# Patient Record
Sex: MALE | Race: BLACK OR AFRICAN AMERICAN | NOT HISPANIC OR LATINO | Employment: UNEMPLOYED | URBAN - METROPOLITAN AREA
[De-identification: names, ages, dates, MRNs, and addresses within clinical notes are randomized per-mention and may not be internally consistent; named-entity substitution may affect disease eponyms.]

---

## 2020-01-07 ENCOUNTER — HOSPITAL ENCOUNTER (EMERGENCY)
Facility: HOSPITAL | Age: 25
Discharge: HOME | End: 2020-01-07
Attending: EMERGENCY MEDICINE
Payer: MEDICAID

## 2020-01-07 VITALS
SYSTOLIC BLOOD PRESSURE: 117 MMHG | DIASTOLIC BLOOD PRESSURE: 70 MMHG | RESPIRATION RATE: 20 BRPM | HEART RATE: 69 BPM | OXYGEN SATURATION: 99 % | TEMPERATURE: 98 F
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
## 2020-01-07 DIAGNOSIS — R22.0 FACIAL SWELLING: Primary | ICD-10-CM

## 2020-01-07 PROCEDURE — 99284 EMERGENCY DEPT VISIT MOD MDM: CPT | Mod: 25,ER
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
## 2021-01-13 ENCOUNTER — HOSPITAL ENCOUNTER (EMERGENCY)
Facility: HOSPITAL | Age: 26
Discharge: HOME | End: 2021-01-13
Attending: EMERGENCY MEDICINE
Payer: COMMERCIAL

## 2021-01-13 VITALS
BODY MASS INDEX: 20.73 KG/M2 | WEIGHT: 140 LBS | DIASTOLIC BLOOD PRESSURE: 75 MMHG | HEART RATE: 72 BPM | HEIGHT: 69 IN | RESPIRATION RATE: 20 BRPM | OXYGEN SATURATION: 98 % | TEMPERATURE: 99 F | SYSTOLIC BLOOD PRESSURE: 136 MMHG

## 2021-01-13 DIAGNOSIS — S61.210A LACERATION OF RIGHT INDEX FINGER WITHOUT FOREIGN BODY WITHOUT DAMAGE TO NAIL, INITIAL ENCOUNTER: Primary | ICD-10-CM

## 2021-01-13 PROCEDURE — 90471 IMMUNIZATION ADMIN: CPT | Mod: ER | Performed by: PHYSICIAN ASSISTANT

## 2021-01-13 PROCEDURE — 12001 RPR S/N/AX/GEN/TRNK 2.5CM/<: CPT | Mod: ER

## 2021-01-13 PROCEDURE — 25000003 PHARM REV CODE 250: Mod: ER | Performed by: PHYSICIAN ASSISTANT

## 2021-01-13 PROCEDURE — 63600175 PHARM REV CODE 636 W HCPCS: Mod: ER | Performed by: PHYSICIAN ASSISTANT

## 2021-01-13 PROCEDURE — 90715 TDAP VACCINE 7 YRS/> IM: CPT | Mod: ER | Performed by: PHYSICIAN ASSISTANT

## 2021-01-13 PROCEDURE — 99284 EMERGENCY DEPT VISIT MOD MDM: CPT | Mod: 25,ER

## 2021-01-13 RX ORDER — LIDOCAINE HYDROCHLORIDE 10 MG/ML
10 INJECTION, SOLUTION EPIDURAL; INFILTRATION; INTRACAUDAL; PERINEURAL
Status: COMPLETED | OUTPATIENT
Start: 2021-01-13 | End: 2021-01-13

## 2021-01-13 RX ORDER — CEPHALEXIN 500 MG/1
500 CAPSULE ORAL 2 TIMES DAILY
Qty: 14 CAPSULE | Refills: 0 | Status: SHIPPED | OUTPATIENT
Start: 2021-01-13 | End: 2021-01-20

## 2021-01-13 RX ORDER — IBUPROFEN 600 MG/1
600 TABLET ORAL EVERY 8 HOURS PRN
Qty: 15 TABLET | Refills: 0 | Status: SHIPPED | OUTPATIENT
Start: 2021-01-13 | End: 2021-01-18

## 2021-01-13 RX ADMIN — LIDOCAINE HYDROCHLORIDE 100 MG: 10 INJECTION, SOLUTION EPIDURAL; INFILTRATION; INTRACAUDAL at 12:01

## 2021-01-13 RX ADMIN — CLOSTRIDIUM TETANI TOXOID ANTIGEN (FORMALDEHYDE INACTIVATED), CORYNEBACTERIUM DIPHTHERIAE TOXOID ANTIGEN (FORMALDEHYDE INACTIVATED), BORDETELLA PERTUSSIS TOXOID ANTIGEN (GLUTARALDEHYDE INACTIVATED), BORDETELLA PERTUSSIS FILAMENTOUS HEMAGGLUTININ ANTIGEN (FORMALDEHYDE INACTIVATED), BORDETELLA PERTUSSIS PERTACTIN ANTIGEN, AND BORDETELLA PERTUSSIS FIMBRIAE 2/3 ANTIGEN 0.5 ML: 5; 2; 2.5; 5; 3; 5 INJECTION, SUSPENSION INTRAMUSCULAR at 12:01

## 2021-01-27 ENCOUNTER — HOSPITAL ENCOUNTER (EMERGENCY)
Facility: HOSPITAL | Age: 26
Discharge: HOME | End: 2021-01-27
Attending: EMERGENCY MEDICINE
Payer: COMMERCIAL

## 2021-01-27 VITALS
OXYGEN SATURATION: 99 % | HEIGHT: 68 IN | RESPIRATION RATE: 20 BRPM | BODY MASS INDEX: 19.7 KG/M2 | SYSTOLIC BLOOD PRESSURE: 126 MMHG | WEIGHT: 130 LBS | DIASTOLIC BLOOD PRESSURE: 63 MMHG | HEART RATE: 68 BPM | TEMPERATURE: 98 F

## 2021-01-27 DIAGNOSIS — Z48.02 ENCOUNTER FOR REMOVAL OF SUTURES: Primary | ICD-10-CM

## 2021-01-27 PROCEDURE — 99282 EMERGENCY DEPT VISIT SF MDM: CPT | Mod: ER

## 2024-07-28 ENCOUNTER — HOSPITAL ENCOUNTER (INPATIENT)
Facility: HOSPITAL | Age: 29
LOS: 1 days | Discharge: HOME | DRG: 917 | End: 2024-07-30
Attending: STUDENT IN AN ORGANIZED HEALTH CARE EDUCATION/TRAINING PROGRAM | Admitting: INTERNAL MEDICINE
Payer: MEDICAID

## 2024-07-28 DIAGNOSIS — R07.9 CHEST PAIN: ICD-10-CM

## 2024-07-28 DIAGNOSIS — G93.40 ACUTE ENCEPHALOPATHY: ICD-10-CM

## 2024-07-28 DIAGNOSIS — T50.901A ACCIDENTAL DRUG OVERDOSE, INITIAL ENCOUNTER: ICD-10-CM

## 2024-07-28 DIAGNOSIS — F12.20 TETRAHYDROCANNABINOL (THC) USE DISORDER, MODERATE, DEPENDENCE: ICD-10-CM

## 2024-07-28 DIAGNOSIS — T50.901A ACCIDENTAL OVERDOSE, INITIAL ENCOUNTER: Primary | ICD-10-CM

## 2024-07-28 DIAGNOSIS — M62.82 NON-TRAUMATIC RHABDOMYOLYSIS: ICD-10-CM

## 2024-07-28 DIAGNOSIS — G93.40 ENCEPHALOPATHY: ICD-10-CM

## 2024-07-28 LAB
ALBUMIN SERPL BCP-MCNC: 4.5 G/DL (ref 3.5–5.2)
ALLENS TEST: ABNORMAL
ALP SERPL-CCNC: 55 U/L (ref 38–126)
ALT SERPL W/O P-5'-P-CCNC: 26 U/L (ref 10–44)
ANION GAP SERPL CALC-SCNC: 12 MMOL/L (ref 8–16)
APAP SERPL-MCNC: <10 UG/ML (ref 10–20)
AST SERPL-CCNC: 115 U/L (ref 15–46)
BASOPHILS # BLD AUTO: 0.01 K/UL (ref 0–0.2)
BASOPHILS NFR BLD: 0.1 % (ref 0–1.9)
BILIRUB SERPL-MCNC: 1.4 MG/DL (ref 0.1–1)
CALCIUM SERPL-MCNC: 9.1 MG/DL (ref 8.7–10.5)
CHLORIDE SERPL-SCNC: 100 MMOL/L (ref 95–110)
CK SERPL-CCNC: >3200 U/L (ref 55–170)
CO2 SERPL-SCNC: 27 MMOL/L (ref 23–29)
CREAT SERPL-MCNC: 0.83 MG/DL (ref 0.5–1.4)
DELSYS: ABNORMAL
DIFFERENTIAL METHOD BLD: ABNORMAL
EOSINOPHIL # BLD AUTO: 0 K/UL (ref 0–0.5)
EOSINOPHIL NFR BLD: 0 % (ref 0–8)
ERYTHROCYTE [DISTWIDTH] IN BLOOD BY AUTOMATED COUNT: 13.1 % (ref 11.5–14.5)
EST. GFR  (NO RACE VARIABLE): >60 ML/MIN/1.73 M^2
ETHANOL SERPL-MCNC: <10 MG/DL
GLUCOSE SERPL-MCNC: 130 MG/DL (ref 70–110)
HCO3 UR-SCNC: 27.8 MMOL/L (ref 24–28)
HCT VFR BLD AUTO: 43 % (ref 40–54)
HGB BLD-MCNC: 14.7 G/DL (ref 14–18)
IMM GRANULOCYTES # BLD AUTO: 0.04 K/UL (ref 0–0.04)
IMM GRANULOCYTES NFR BLD AUTO: 0.3 % (ref 0–0.5)
LYMPHOCYTES # BLD AUTO: 1.7 K/UL (ref 1–4.8)
LYMPHOCYTES NFR BLD: 12.6 % (ref 18–48)
MCH RBC QN AUTO: 28.7 PG (ref 27–31)
MCHC RBC AUTO-ENTMCNC: 34.2 G/DL (ref 32–36)
MCV RBC AUTO: 84 FL (ref 82–98)
MONOCYTES # BLD AUTO: 1 K/UL (ref 0.3–1)
MONOCYTES NFR BLD: 7.4 % (ref 4–15)
NEUTROPHILS # BLD AUTO: 10.6 K/UL (ref 1.8–7.7)
NEUTROPHILS NFR BLD: 79.6 % (ref 38–73)
NRBC BLD-RTO: 0 /100 WBC
PCO2 BLDA: 57.1 MMHG (ref 35–45)
PH SMN: 7.3 [PH] (ref 7.35–7.45)
PLATELET # BLD AUTO: 235 K/UL (ref 150–450)
PMV BLD AUTO: 10.9 FL (ref 9.2–12.9)
PO2 BLDA: 215 MMHG (ref 80–100)
POC BE: 1 MMOL/L
POC SATURATED O2: 100 % (ref 95–100)
POC TCO2: 30 MMOL/L (ref 23–27)
POCT GLUCOSE: 118 MG/DL (ref 70–110)
POTASSIUM SERPL-SCNC: 4 MMOL/L (ref 3.5–5.1)
PROT SERPL-MCNC: 8.4 G/DL (ref 6–8.4)
RBC # BLD AUTO: 5.13 M/UL (ref 4.6–6.2)
SALICYLATES SERPL-MCNC: <5 MG/DL (ref 15–30)
SAMPLE: ABNORMAL
SITE: ABNORMAL
SODIUM SERPL-SCNC: 139 MMOL/L (ref 136–145)
UUN UR-MCNC: 13 MG/DL (ref 2–20)
WBC # BLD AUTO: 13.3 K/UL (ref 3.9–12.7)

## 2024-07-28 PROCEDURE — 96361 HYDRATE IV INFUSION ADD-ON: CPT | Mod: ER

## 2024-07-28 PROCEDURE — 36600 WITHDRAWAL OF ARTERIAL BLOOD: CPT | Mod: ER

## 2024-07-28 PROCEDURE — 99900035 HC TECH TIME PER 15 MIN (STAT): Mod: ER

## 2024-07-28 PROCEDURE — G0378 HOSPITAL OBSERVATION PER HR: HCPCS | Mod: ER

## 2024-07-28 PROCEDURE — 80143 DRUG ASSAY ACETAMINOPHEN: CPT | Mod: ER | Performed by: STUDENT IN AN ORGANIZED HEALTH CARE EDUCATION/TRAINING PROGRAM

## 2024-07-28 PROCEDURE — 85025 COMPLETE CBC W/AUTO DIFF WBC: CPT | Mod: ER | Performed by: STUDENT IN AN ORGANIZED HEALTH CARE EDUCATION/TRAINING PROGRAM

## 2024-07-28 PROCEDURE — 80053 COMPREHEN METABOLIC PANEL: CPT | Mod: ER | Performed by: STUDENT IN AN ORGANIZED HEALTH CARE EDUCATION/TRAINING PROGRAM

## 2024-07-28 PROCEDURE — 96374 THER/PROPH/DIAG INJ IV PUSH: CPT | Mod: ER

## 2024-07-28 PROCEDURE — 27000221 HC OXYGEN, UP TO 24 HOURS: Mod: ER

## 2024-07-28 PROCEDURE — 96376 TX/PRO/DX INJ SAME DRUG ADON: CPT | Mod: ER

## 2024-07-28 PROCEDURE — 82550 ASSAY OF CK (CPK): CPT | Mod: ER | Performed by: STUDENT IN AN ORGANIZED HEALTH CARE EDUCATION/TRAINING PROGRAM

## 2024-07-28 PROCEDURE — 63600175 PHARM REV CODE 636 W HCPCS: Mod: ER | Performed by: STUDENT IN AN ORGANIZED HEALTH CARE EDUCATION/TRAINING PROGRAM

## 2024-07-28 PROCEDURE — 93010 ELECTROCARDIOGRAM REPORT: CPT | Mod: ,,, | Performed by: STUDENT IN AN ORGANIZED HEALTH CARE EDUCATION/TRAINING PROGRAM

## 2024-07-28 PROCEDURE — 99291 CRITICAL CARE FIRST HOUR: CPT | Mod: ER

## 2024-07-28 PROCEDURE — 25000003 PHARM REV CODE 250: Mod: ER | Performed by: STUDENT IN AN ORGANIZED HEALTH CARE EDUCATION/TRAINING PROGRAM

## 2024-07-28 PROCEDURE — 80179 DRUG ASSAY SALICYLATE: CPT | Mod: ER | Performed by: STUDENT IN AN ORGANIZED HEALTH CARE EDUCATION/TRAINING PROGRAM

## 2024-07-28 PROCEDURE — 82077 ASSAY SPEC XCP UR&BREATH IA: CPT | Mod: ER | Performed by: STUDENT IN AN ORGANIZED HEALTH CARE EDUCATION/TRAINING PROGRAM

## 2024-07-28 PROCEDURE — 82803 BLOOD GASES ANY COMBINATION: CPT | Mod: ER

## 2024-07-28 PROCEDURE — 93005 ELECTROCARDIOGRAM TRACING: CPT | Mod: ER

## 2024-07-28 PROCEDURE — 94760 N-INVAS EAR/PLS OXIMETRY 1: CPT | Mod: ER,XB

## 2024-07-28 RX ORDER — NALOXONE HCL 0.4 MG/ML
0.4 VIAL (ML) INJECTION
Status: COMPLETED | OUTPATIENT
Start: 2024-07-28 | End: 2024-07-28

## 2024-07-28 RX ORDER — SODIUM CHLORIDE 9 MG/ML
1000 INJECTION, SOLUTION INTRAVENOUS
Status: COMPLETED | OUTPATIENT
Start: 2024-07-28 | End: 2024-07-28

## 2024-07-28 RX ADMIN — SODIUM CHLORIDE 1000 ML: 0.9 INJECTION, SOLUTION INTRAVENOUS at 07:07

## 2024-07-28 RX ADMIN — NALXONE HYDROCHLORIDE 0.4 MG: 0.4 INJECTION INTRAMUSCULAR; INTRAVENOUS; SUBCUTANEOUS at 08:07

## 2024-07-28 RX ADMIN — SODIUM CHLORIDE 1000 ML: 9 INJECTION, SOLUTION INTRAVENOUS at 10:07

## 2024-07-28 RX ADMIN — SODIUM CHLORIDE 1000 ML: 0.9 INJECTION, SOLUTION INTRAVENOUS at 10:07

## 2024-07-29 PROBLEM — T50.901A ACCIDENTAL DRUG OVERDOSE: Status: ACTIVE | Noted: 2024-07-29

## 2024-07-29 PROBLEM — F12.20 TETRAHYDROCANNABINOL (THC) USE DISORDER, MODERATE, DEPENDENCE: Status: ACTIVE | Noted: 2024-07-29

## 2024-07-29 PROBLEM — G93.40 ENCEPHALOPATHY: Status: ACTIVE | Noted: 2024-07-29

## 2024-07-29 PROBLEM — G93.40 ENCEPHALOPATHY: Status: RESOLVED | Noted: 2024-07-29 | Resolved: 2024-07-29

## 2024-07-29 PROBLEM — T50.901A ACCIDENTAL OVERDOSE: Status: ACTIVE | Noted: 2024-07-29

## 2024-07-29 PROBLEM — G93.40 ACUTE ENCEPHALOPATHY: Status: ACTIVE | Noted: 2024-07-29

## 2024-07-29 PROBLEM — M62.82 NON-TRAUMATIC RHABDOMYOLYSIS: Status: ACTIVE | Noted: 2024-07-29

## 2024-07-29 LAB
ALBUMIN SERPL BCP-MCNC: 3.4 G/DL (ref 3.5–5.2)
ALLENS TEST: YES
ALP SERPL-CCNC: 41 U/L (ref 55–135)
ALT SERPL W/O P-5'-P-CCNC: 23 U/L (ref 10–44)
AMPHET+METHAMPHET UR QL: NEGATIVE
ANION GAP SERPL CALC-SCNC: 5 MMOL/L (ref 8–16)
ANION GAP SERPL CALC-SCNC: 7 MMOL/L (ref 8–16)
AST SERPL-CCNC: 85 U/L (ref 10–40)
BARBITURATES UR QL SCN>200 NG/ML: NEGATIVE
BASOPHILS # BLD AUTO: 0.03 K/UL (ref 0–0.2)
BASOPHILS NFR BLD: 0.4 % (ref 0–1.9)
BENZODIAZ UR QL SCN>200 NG/ML: NEGATIVE
BILIRUB SERPL-MCNC: 1.4 MG/DL (ref 0.1–1)
BILIRUB UR QL STRIP: NEGATIVE
BUN SERPL-MCNC: 11 MG/DL (ref 6–20)
BUN SERPL-MCNC: 12 MG/DL (ref 6–20)
BZE UR QL SCN: NEGATIVE
CALCIUM SERPL-MCNC: 8.6 MG/DL (ref 8.7–10.5)
CALCIUM SERPL-MCNC: 8.8 MG/DL (ref 8.7–10.5)
CANNABINOIDS UR QL SCN: ABNORMAL
CHLORIDE SERPL-SCNC: 108 MMOL/L (ref 95–110)
CHLORIDE SERPL-SCNC: 109 MMOL/L (ref 95–110)
CK SERPL-CCNC: 3246 U/L (ref 20–200)
CK SERPL-CCNC: 4107 U/L (ref 20–200)
CLARITY UR: CLEAR
CO2 SERPL-SCNC: 27 MMOL/L (ref 23–29)
CO2 SERPL-SCNC: 28 MMOL/L (ref 23–29)
COLOR UR: COLORLESS
CREAT SERPL-MCNC: 0.9 MG/DL (ref 0.5–1.4)
CREAT SERPL-MCNC: 1.1 MG/DL (ref 0.5–1.4)
CREAT UR-MCNC: 47.2 MG/DL (ref 23–375)
DIFFERENTIAL METHOD BLD: ABNORMAL
EOSINOPHIL # BLD AUTO: 0 K/UL (ref 0–0.5)
EOSINOPHIL NFR BLD: 0.4 % (ref 0–8)
ERYTHROCYTE [DISTWIDTH] IN BLOOD BY AUTOMATED COUNT: 13.5 % (ref 11.5–14.5)
EST. GFR  (NO RACE VARIABLE): >60 ML/MIN/1.73 M^2
EST. GFR  (NO RACE VARIABLE): >60 ML/MIN/1.73 M^2
FIO2: 21 %
GLUCOSE SERPL-MCNC: 100 MG/DL (ref 70–110)
GLUCOSE SERPL-MCNC: 105 MG/DL (ref 70–110)
GLUCOSE UR QL STRIP: NEGATIVE
HCT VFR BLD AUTO: 39.5 % (ref 40–54)
HGB BLD-MCNC: 13.2 G/DL (ref 14–18)
HGB UR QL STRIP: NEGATIVE
IMM GRANULOCYTES # BLD AUTO: 0.02 K/UL (ref 0–0.04)
IMM GRANULOCYTES NFR BLD AUTO: 0.2 % (ref 0–0.5)
INR PPP: 1.2 (ref 0.8–1.2)
KETONES UR QL STRIP: NEGATIVE
LACTATE SERPL-SCNC: 1.1 MMOL/L (ref 0.5–2.2)
LEUKOCYTE ESTERASE UR QL STRIP: NEGATIVE
LYMPHOCYTES # BLD AUTO: 1.7 K/UL (ref 1–4.8)
LYMPHOCYTES NFR BLD: 19.9 % (ref 18–48)
MAGNESIUM SERPL-MCNC: 1.9 MG/DL (ref 1.6–2.6)
MCH RBC QN AUTO: 28.2 PG (ref 27–31)
MCHC RBC AUTO-ENTMCNC: 33.4 G/DL (ref 32–36)
MCV RBC AUTO: 84 FL (ref 82–98)
METHADONE UR QL SCN>300 NG/ML: NEGATIVE
MONOCYTES # BLD AUTO: 0.6 K/UL (ref 0.3–1)
MONOCYTES NFR BLD: 7.6 % (ref 4–15)
NEUTROPHILS # BLD AUTO: 6 K/UL (ref 1.8–7.7)
NEUTROPHILS NFR BLD: 71.5 % (ref 38–73)
NITRITE UR QL STRIP: NEGATIVE
NRBC BLD-RTO: 0 /100 WBC
OHS QRS DURATION: 80 MS
OHS QTC CALCULATION: 406 MS
OPIATES UR QL SCN: NEGATIVE
PCO2 BLDA: 56.3 MMHG (ref 35–45)
PCP UR QL SCN>25 NG/ML: NEGATIVE
PH SMN: 7.32 [PH] (ref 7.35–7.45)
PH UR STRIP: 6 [PH] (ref 5–8)
PHOSPHATE SERPL-MCNC: 2.6 MG/DL (ref 2.7–4.5)
PLATELET # BLD AUTO: 193 K/UL (ref 150–450)
PMV BLD AUTO: 11.2 FL (ref 9.2–12.9)
PO2 BLDA: 24.6 MMHG (ref 40–60)
POC BASE DEFICIT: 1.9 MMOL/L (ref -2–2)
POC HCO3: 29.2 MMOL/L (ref 24–28)
POC PERFORMED BY: ABNORMAL
POC SATURATED O2: 45.5 % (ref 95–100)
POCT GLUCOSE: 98 MG/DL (ref 70–110)
POTASSIUM SERPL-SCNC: 3.9 MMOL/L (ref 3.5–5.1)
POTASSIUM SERPL-SCNC: 4.2 MMOL/L (ref 3.5–5.1)
PROT SERPL-MCNC: 6.5 G/DL (ref 6–8.4)
PROT UR QL STRIP: NEGATIVE
PROTHROMBIN TIME: 12.6 SEC (ref 9–12.5)
RBC # BLD AUTO: 4.68 M/UL (ref 4.6–6.2)
SODIUM SERPL-SCNC: 141 MMOL/L (ref 136–145)
SODIUM SERPL-SCNC: 143 MMOL/L (ref 136–145)
SP GR UR STRIP: 1.01 (ref 1–1.03)
SPECIMEN SOURCE: ABNORMAL
TOXICOLOGY INFORMATION: ABNORMAL
URN SPEC COLLECT METH UR: ABNORMAL
UROBILINOGEN UR STRIP-ACNC: NEGATIVE EU/DL
WBC # BLD AUTO: 8.33 K/UL (ref 3.9–12.7)

## 2024-07-29 PROCEDURE — 36415 COLL VENOUS BLD VENIPUNCTURE: CPT

## 2024-07-29 PROCEDURE — 36415 COLL VENOUS BLD VENIPUNCTURE: CPT | Mod: XB

## 2024-07-29 PROCEDURE — 80320 DRUG SCREEN QUANTALCOHOLS: CPT

## 2024-07-29 PROCEDURE — 80053 COMPREHEN METABOLIC PANEL: CPT

## 2024-07-29 PROCEDURE — 94761 N-INVAS EAR/PLS OXIMETRY MLT: CPT | Mod: XB

## 2024-07-29 PROCEDURE — 80048 BASIC METABOLIC PNL TOTAL CA: CPT | Mod: XB

## 2024-07-29 PROCEDURE — 81003 URINALYSIS AUTO W/O SCOPE: CPT | Mod: 59

## 2024-07-29 PROCEDURE — 21400001 HC TELEMETRY ROOM

## 2024-07-29 PROCEDURE — 99900035 HC TECH TIME PER 15 MIN (STAT)

## 2024-07-29 PROCEDURE — 63600175 PHARM REV CODE 636 W HCPCS

## 2024-07-29 PROCEDURE — 80307 DRUG TEST PRSMV CHEM ANLYZR: CPT

## 2024-07-29 PROCEDURE — 83735 ASSAY OF MAGNESIUM: CPT

## 2024-07-29 PROCEDURE — 82803 BLOOD GASES ANY COMBINATION: CPT

## 2024-07-29 PROCEDURE — 83605 ASSAY OF LACTIC ACID: CPT

## 2024-07-29 PROCEDURE — 85025 COMPLETE CBC W/AUTO DIFF WBC: CPT

## 2024-07-29 PROCEDURE — 82550 ASSAY OF CK (CPK): CPT | Mod: 91

## 2024-07-29 PROCEDURE — 82550 ASSAY OF CK (CPK): CPT

## 2024-07-29 PROCEDURE — 84100 ASSAY OF PHOSPHORUS: CPT

## 2024-07-29 PROCEDURE — 85610 PROTHROMBIN TIME: CPT

## 2024-07-29 RX ORDER — SODIUM CHLORIDE 0.9 % (FLUSH) 0.9 %
10 SYRINGE (ML) INJECTION EVERY 12 HOURS PRN
Status: DISCONTINUED | OUTPATIENT
Start: 2024-07-29 | End: 2024-07-30 | Stop reason: HOSPADM

## 2024-07-29 RX ORDER — ONDANSETRON HYDROCHLORIDE 2 MG/ML
4 INJECTION, SOLUTION INTRAVENOUS EVERY 8 HOURS PRN
Status: DISCONTINUED | OUTPATIENT
Start: 2024-07-29 | End: 2024-07-30 | Stop reason: HOSPADM

## 2024-07-29 RX ORDER — SODIUM CHLORIDE, SODIUM LACTATE, POTASSIUM CHLORIDE, CALCIUM CHLORIDE 600; 310; 30; 20 MG/100ML; MG/100ML; MG/100ML; MG/100ML
INJECTION, SOLUTION INTRAVENOUS CONTINUOUS
Status: ACTIVE | OUTPATIENT
Start: 2024-07-29 | End: 2024-07-29

## 2024-07-29 RX ORDER — IBUPROFEN 200 MG
24 TABLET ORAL
Status: DISCONTINUED | OUTPATIENT
Start: 2024-07-29 | End: 2024-07-30 | Stop reason: HOSPADM

## 2024-07-29 RX ORDER — IBUPROFEN 200 MG
16 TABLET ORAL
Status: DISCONTINUED | OUTPATIENT
Start: 2024-07-29 | End: 2024-07-30 | Stop reason: HOSPADM

## 2024-07-29 RX ORDER — SODIUM CHLORIDE, SODIUM LACTATE, POTASSIUM CHLORIDE, CALCIUM CHLORIDE 600; 310; 30; 20 MG/100ML; MG/100ML; MG/100ML; MG/100ML
INJECTION, SOLUTION INTRAVENOUS CONTINUOUS
Status: ACTIVE | OUTPATIENT
Start: 2024-07-29 | End: 2024-07-30

## 2024-07-29 RX ORDER — GLUCAGON 1 MG
1 KIT INJECTION
Status: DISCONTINUED | OUTPATIENT
Start: 2024-07-29 | End: 2024-07-30 | Stop reason: HOSPADM

## 2024-07-29 RX ORDER — NALOXONE HCL 0.4 MG/ML
0.02 VIAL (ML) INJECTION
Status: DISCONTINUED | OUTPATIENT
Start: 2024-07-29 | End: 2024-07-30 | Stop reason: HOSPADM

## 2024-07-29 RX ADMIN — SODIUM CHLORIDE, POTASSIUM CHLORIDE, SODIUM LACTATE AND CALCIUM CHLORIDE 1000 ML: 600; 310; 30; 20 INJECTION, SOLUTION INTRAVENOUS at 03:07

## 2024-07-29 RX ADMIN — SODIUM CHLORIDE, POTASSIUM CHLORIDE, SODIUM LACTATE AND CALCIUM CHLORIDE: 600; 310; 30; 20 INJECTION, SOLUTION INTRAVENOUS at 07:07

## 2024-07-29 RX ADMIN — SODIUM CHLORIDE, POTASSIUM CHLORIDE, SODIUM LACTATE AND CALCIUM CHLORIDE 1000 ML: 600; 310; 30; 20 INJECTION, SOLUTION INTRAVENOUS at 12:07

## 2024-07-29 RX ADMIN — SODIUM CHLORIDE, POTASSIUM CHLORIDE, SODIUM LACTATE AND CALCIUM CHLORIDE: 600; 310; 30; 20 INJECTION, SOLUTION INTRAVENOUS at 01:07

## 2024-07-29 NOTE — ED NOTES
Voicemail left for pt's mother informing her pt is being transferred at this time. Pt remains alert to verbal stimuli only and only able to answer simple questions/commands. VSS.

## 2024-07-29 NOTE — PLAN OF CARE
Oriented to self only. Pt very lethargic throughout the day. Mother @ bedside. Diet advanced to regular. Tolerated food & liquid well. Was able to use urinal by himself. MD rounded on pt this am & orders modified as needed. Hydrated w/ continuous fluids. Bed in lowest position. Call light within reach.

## 2024-07-29 NOTE — ED NOTES
While attempting to void in urinal pt missed urinal and voided on self. Pt cleaned, linens changed and new gown provided. Pt's clothing placed in belongings bag and labeled with pt info.

## 2024-07-29 NOTE — ED NOTES
Pt presented to ED after mother reported possible overdose stating pt has been minimally responsive all day long. Pt currently responding to verbal stimuli and able to state name but is not able to state any symptoms. Pt denies taking drugs or medication today.

## 2024-07-29 NOTE — PLAN OF CARE
Resting in bed denies pain and discomfort , mother at the bedside. Call light within reach bed in low position bed alarm in place.

## 2024-07-29 NOTE — PLAN OF CARE
Problem: Adult Inpatient Plan of Care  Goal: Plan of Care Review  Outcome: Progressing     VIRTUAL NURSE:  Cued into patient's room.  Patient not responding to introduction and permission inquiry.  Patient resting comfortably in bed with eyes closed; respirations even and unlabored.  No distress noted.    Labs, notes, orders, and careplan reviewed.       07/29/24 0224   Patient Request   Patient Requested ALON Banks woke patient up with tactile and loud verbal stimuli. Lethargic; nonsensical words when asked admission questions.   Nurse Notification   Charge Nurse Notified? Yes   Name of Charge Nurse ALON Banks   Bedside Nurse Notified? Yes   Name of Bedside Nurse ALON Harris   Nurse Notfication Method Secure Chat   Nurse Notified Of Other  (patient arrived lethargic; no change since arrival)   Admission   Initial VN Admission Questions Incomplete   Communication Issues? None   Shift   Virtual Nurse - Rounding Complete   Virtual Nurse - Patient Verbalized Approval Of Other (See Comments)  (RUTH ANN-lethargic)   Type of Frequent Check   Type Patient Rounds   Safety/Activity   Patient Rounds bed in low position;placement of personal items at bedside;bed wheels locked;call light in patient/parent reach;visualized patient;clutter free environment maintained   Safety Promotion/Fall Prevention assistive device/personal item within reach;commode/urinal/bedpan at bedside;high risk medications identified;medications reviewed;room near unit station;side rails raised x 3;supervised activity;Supervised toileting - stay within arms reach   Safety Precautions emergency equipment at bedside   Positioning   Body Position neutral body alignment;neutral head position;position changed independently   Head of Bed (HOB) Positioning HOB at 60-90 degrees   Pain/Comfort/Sleep   Preferred Pain Scale FACES (Singh-Chavez FACES Pain Rating Scale)   Comfort/Acceptable Pain Level 0   FACES Pain Rating: Rest 0-->no hurt   FACES Pain Rating: Activity  0-->no hurt   Sleep/Rest/Relaxation appears asleep;other (see comments)  (lethargic)

## 2024-07-29 NOTE — ED NOTES
No changes in AMS noted after narcan administration. Pt remains responsive to voice and answering some questions.
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
## 2024-07-29 NOTE — PLAN OF CARE
SW met with pt at bedside this AM to complete DCA with pt. Pt stated that he was having 0/10 pain and was in a pleasant mood throughout conversation. Pt was slow to answer questions but did confirm that pt lives at home with his mother Rigo [phone] and will have her help transport him home at time of d/c. Pt does not use any HME at home and is fully independent in his ADL's and has his mom transport him to all his medical appts. Pt is not current with any HH company and is not receiving HD. Pt did not have any additional questions or concerns for sw at this time. White board updated with CM name and contact information.  Discharge brochure provided.  Pt encouraged to call with any questions or concerns.  Cm will continue to follow pt through transitions of care and assist with any discharge needs.    AZAEL Denton  [phone]     07/29/24 1103   Discharge Assessment   Assessment Type Discharge Planning Assessment   Confirmed/corrected address, phone number and insurance Yes   Confirmed Demographics Correct on Facesheet   Source of Information patient   When was your last doctors appointment?   (pt was not sure)   Communicated BANDAR with patient/caregiver Yes   Reason For Admission Principal Encephalopathy   People in Home parent(s)   Facility Arrived From: home   Do you expect to return to your current living situation? Yes   Do you have help at home or someone to help you manage your care at home? Yes   Who are your caregiver(s) and their phone number(s)? mom Rigo [phone]   Prior to hospitilization cognitive status: Alert/Oriented   Current cognitive status: Alert/Oriented   Walking or Climbing Stairs Difficulty no   Dressing/Bathing Difficulty no   Do you have any problems with: Errands/Grocery   Home Accessibility wheelchair accessible   Home Layout Able to live on 1st floor   Equipment Currently Used at Home none   Readmission within 30 days? No   Patient currently being followed by outpatient  case management? No   Do you currently have service(s) that help you manage your care at home? No   Do you take prescription medications? Yes   Do you have prescription coverage? Yes   Coverage Medicaid   Do you have any problems affording any of your prescribed medications? No   Is the patient taking medications as prescribed? yes   Who is going to help you get home at discharge? anton Sierra [phone]   How do you get to doctors appointments? family or friend will provide   Are you on dialysis? No   Do you take coumadin? No   Discharge Plan A Home with family   DME Needed Upon Discharge  none   Discharge Plan discussed with: Patient   Transition of Care Barriers None   Physical Activity   On average, how many days per week do you engage in moderate to strenuous exercise (like a brisk walk)? Pt Declined   On average, how many minutes do you engage in exercise at this level? Pt Declined   Financial Resource Strain   How hard is it for you to pay for the very basics like food, housing, medical care, and heating? Pt Declined   Housing Stability   In the last 12 months, was there a time when you were not able to pay the mortgage or rent on time? Pt Declined   At any time in the past 12 months, were you homeless or living in a shelter (including now)? Pt Declined   Transportation Needs   Has the lack of transportation kept you from medical appointments, meetings, work or from getting things needed for daily living? Patient declined   Food Insecurity   Within the past 12 months, you worried that your food would run out before you got the money to buy more. Pt Declined   Within the past 12 months, the food you bought just didn't last and you didn't have money to get more. Pt Declined   Stress   Do you feel stress - tense, restless, nervous, or anxious, or unable to sleep at night because your mind is troubled all the time - these days? Pt Declined   Social Isolation   How often do you feel lonely or isolated from those  around you?  Patient declined   Alcohol Use   Q1: How often do you have a drink containing alcohol? Pt Declined   Q2: How many drinks containing alcohol do you have on a typical day when you are drinking? Pt Declined   Q3: How often do you have six or more drinks on one occasion? Pt Declined   Utilities   In the past 12 months has the electric, gas, oil, or water company threatened to shut off services in your home? Pt Declined   Health Literacy   How often do you need to have someone help you when you read instructions, pamphlets, or other written material from your doctor or pharmacy? Never   OTHER   Name(s) of People in Home anton Sierra [phone]

## 2024-07-29 NOTE — NURSING
Received on the unit per stretcher with EMS no acute distress noted, transferred from stretcher to bed with 2 person assist.   Currently resting in bed call light within reach bed in low position bed alarm in place.  Responding to verbal stimuli at this time with minimal responses only stating name and date of birth denying pain.     The on call number for physician paged at [phone] for notification of arrival to unit and orders.

## 2024-07-29 NOTE — PROGRESS NOTES
"[facility] IM Resident HO-4 Progress Note    Subjective:      This morning Mr. Lyon is awake sitting in bed, Still somnolent but responds to questions appropriately oriented. Asking for breakfast and to watch sports. Denies any ingestion of unknown substances or intentional overdose of pills. He did endorse taking multiple 'edibles' of THC yesterday.He said he feels 7/10 where 10 is totally normal. Denies any pain.      Objective:   Last 24 Hour Vital Signs:  BP  Min: 105/66  Max: 131/88  Temp  Av °F (36.7 °C)  Min: 97.6 °F (36.4 °C)  Max: 98.5 °F (36.9 °C)  Pulse  Av.3  Min: 50  Max: 83  Resp  Avg: 15.2  Min: 12  Max: 20  SpO2  Av.2 %  Min: 95 %  Max: 100 %  Height  Av' 8" (172.7 cm)  Min: 5' 8" (172.7 cm)  Max: 5' 8" (172.7 cm)  Weight  Av kg (154 lb 6.4 oz)  Min: 65.8 kg (145 lb)  Max: 74.3 kg (163 lb 12.8 oz)  I/O last 3 completed shifts:  In: 2791.7 [I.V.:2000; IV Piggyback:791.7]  Out: 550 [Urine:550]    Physical Examination:  GEN- AOx3, Answering questions appropriately, participating in exam, sleepy  HEENT- PERRL, EOMI, MMM. Scleral injection bilaterally.  NECK- No thyromegaly or other masses,  CARDIAC- RRR, no murmurs or rubs.   RESP- CTAB, normal work of breathing, no wheezes, crackles, or rhonchi  ABD- Soft, NT, ND  EXT- No clubbing, cyanosis, or edema; 2+ DP/PT pulses  NEURO - CN intact. Moves all four extremities spontaneously; light touch sensation intact and symmetric. 5/5 strength to bilateral upper and lower extremities.   SKIN -Warm and dry; no rashes    Laboratory:  Laboratory Data Reviewed: yes  Pertinent Findings:  Urine tox screen + for THC    CK: 3200.     Other Results:  Current Medications:     Infusions:   lactated ringers   Intravenous Continuous            Scheduled:   lactated ringers  1,000 mL Intravenous Once        PRN:    Current Facility-Administered Medications:     dextrose 10%, 12.5 g, Intravenous, PRN    dextrose 10%, 25 g, Intravenous, PRN    glucagon (human " recombinant), 1 mg, Intramuscular, PRN    glucose, 16 g, Oral, PRN    glucose, 24 g, Oral, PRN    naloxone, 0.02 mg, Intravenous, PRN    ondansetron, 4 mg, Intravenous, Q8H PRN    sodium chloride 0.9%, 10 mL, Intravenous, Q12H PRN    Assessment:     Shaq Lyon is a 28 y.o.male with no prior PMH admitted for acute encephalopathy. Concern for tramadol overdose from story of bystander, no response to Narcan or respiratory depression. Endorses taking multiple edibles of THC, Utox only postiive for THC. He has improved. Still somnolent so remaining in observation.      Plan:   Substance Overdose  Acute encephalopathy - Improving  Utox positive for THC, no respiratory depression, no response to Narcan, initially with respiratory acidosis since resolved. Didn't endorse other substance use yesterday but found with white pills.   - Continue to monitor  - Psych consult    Rhabdomyolysis  Elevated CK  Elevated Transaminase  CK 3200 - 4100, unclear how long was down. Denies pain, has full ROM of extremities./ s/p 2 boluses  - 1L bolus, 2xmIVF   - repeat CK Q12H  - CMP QAM  -Monitor compartments for signs of compartment syndrome  -Strict I/O to monitor urine output for signs of worsening renal function    Diet: Regular  Ppx: SCDs    Dispo: Pending improvement in mental status, psych colby Hanley  [facility] Internal Medicine/Pediatrics HO-4  U IM Service Team A    [facility] Medicine Hospitalist Pager numbers:   [facility] Hospitalist Medicine Team A (Elias/Catarina): [phone]  [facility] Hospitalist Medicine Team B (Arjun/Zina):  [phone]

## 2024-07-29 NOTE — ED NOTES
Pt remains alert to verbal stimuli. Cardiac monitor, continuous pulse ox and nibp cuff in place. Call light within reach

## 2024-07-29 NOTE — ED PROVIDER NOTES
Encounter Date: 7/28/2024       History     Chief Complaint   Patient presents with    Drug Overdose     Pt found down on ground unresponsive about 2:30. Mother reports vomit on floor, tried putting ice on floor. Mother went back and check on the at 6:30 this morning and sleeping. Mother reports checked on him all day kept checking on him, and pt only responding a little throughout the day. She put pt in shower tonight and brought him to ER     HPI  28-year-old male with no reported medical history presents brought in by family through triage for altered mental status for a full day.  Mother found patient lying on the ground at 2:30 a.m. this morning, and stated that was vomit on the floor.  She checked on them several times every several hours over the course of the day, and found them still lying there.  This afternoon she picked them up and tried to put them in the shower to wake them up, and they did not.  Review of patient's allergies indicates:  No Known Allergies  Past Medical History:   Diagnosis Date    ADHD     Frontal lobe epilepsy     Seizures      No past surgical history on file.  No family history on file.  Social History     Tobacco Use    Smoking status: Some Days    Smokeless tobacco: Never   Substance Use Topics    Alcohol use: Never    Drug use: Never     Review of Systems  Unable to obtain due to encephalopathy  Physical Exam     Initial Vitals [07/28/24 1944]   BP Pulse Resp Temp SpO2   121/77 83 12 98.5 °F (36.9 °C) 95 %      MAP       --         Physical Exam  Physical  General: Awake, alert, no acute distress  Head: Normocephalic, atraumatic  Neck: Trachea midline, full range of motion, no meningismus  ENT: Atraumatic, Airway Patent  Cardio: Regular Rate, Regular Rhythm, Heart Sounds Normal, Capillary refill normal  Chest: Atraumatic, No respiratory distress no use of accessory muscles to breath, normal rate, sounds even and clear to auscultation  Abdomen: Soft, Nontender, no involuntary  guarding, rigidity, or rebound.  Upper Ext: Atraumatic, Inspection normal, no swelling  Lower Ext: Atraumatic, Inspection normal, no swelling  Neuro:  Reflexes +2 to +3 in even, overall somewhat rigid, very slow and stammering to respond, however correctly states his name and that he is in the hospital, and follows some limited basic commands on a delayed basis.  ED Course   Procedures  Labs Reviewed   CBC W/ AUTO DIFFERENTIAL - Abnormal       Result Value    WBC 13.30 (*)     RBC 5.13      Hemoglobin 14.7      Hematocrit 43.0      MCV 84      MCH 28.7      MCHC 34.2      RDW 13.1      Platelets 235      MPV 10.9      Immature Granulocytes 0.3      Gran # (ANC) 10.6 (*)     Immature Grans (Abs) 0.04      Lymph # 1.7      Mono # 1.0      Eos # 0.0      Baso # 0.01      nRBC 0      Gran % 79.6 (*)     Lymph % 12.6 (*)     Mono % 7.4      Eosinophil % 0.0      Basophil % 0.1      Differential Method Automated     COMPREHENSIVE METABOLIC PANEL - Abnormal    Sodium 139      Potassium 4.0      Chloride 100      CO2 27      Glucose 130 (*)     BUN 13      Creatinine 0.83      Calcium 9.1      Total Protein 8.4      Albumin 4.5      Total Bilirubin 1.4 (*)     Alkaline Phosphatase 55       (*)     ALT 26      Anion Gap 12      eGFR >60.0     SALICYLATE LEVEL - Abnormal    Salicylate Lvl <5.0 (*)    CK - Abnormal    CPK >3200 (*)    POCT GLUCOSE - Abnormal    POCT Glucose 118 (*)    ISTAT PROCEDURE - Abnormal    POC PH 7.296 (*)     POC PCO2 57.1 (*)     POC PO2 215 (*)     POC HCO3 27.8      POC BE 1      POC SATURATED O2 100      POC TCO2 30 (*)     Sample ARTERIAL      Site RR      Allens Test Pass      DelSys Nasal Can     ALCOHOL,MEDICAL (ETHANOL)    Alcohol, Serum <10     ACETAMINOPHEN LEVEL    Acetaminophen (Tylenol), Serum <10.0     URINALYSIS, REFLEX TO URINE CULTURE   DRUG SCREEN PANEL, URINE EMERGENCY   CARBON MONOXIDE, BLOOD   POCT GLUCOSE MONITORING CONTINUOUS          Imaging Results              CT Head  "Without Contrast (Final result)  Result time 07/28/24 21:28:49      Final result by Sadia Rubio MD (07/28/24 21:28:49)                   Impression:      No acute abnormality.      Electronically signed by: Sadia Rubio  Date:    07/28/2024  Time:    21:28               Narrative:    EXAMINATION:  CT HEAD WITHOUT CONTRAST    CLINICAL HISTORY:  Mental status change, unknown cause;encephalopathy;    TECHNIQUE:  Low dose axial CT images obtained throughout the head without intravenous contrast. Sagittal and coronal reconstructions were performed.    COMPARISON:  None.    FINDINGS:  Intracranial compartment:    Ventricles and sulci are normal in size for age without evidence of hydrocephalus. No extra-axial blood or fluid collections.    The brain parenchyma appears normal. No parenchymal mass, hemorrhage, edema or major vascular distribution infarct.    Skull/extracranial contents (limited evaluation): No fracture.                                       Medications   0.9%  NaCl infusion (0 mLs Intravenous Stopped 7/28/24 2106)   naloxone 0.4 mg/mL injection 0.4 mg (0.4 mg Intravenous Given 7/28/24 2005)   naloxone 0.4 mg/mL injection 0.4 mg (0.4 mg Intravenous Given 7/28/24 2045)   0.9%  NaCl infusion (0 mLs Intravenous Stopped 7/28/24 2244)   0.9%  NaCl infusion (1,000 mLs Intravenous New Bag 7/28/24 2245)     Medical Decision Making  Amount and/or Complexity of Data Reviewed  Labs: ordered.  Radiology: ordered.    Risk  Prescription drug management.               ED Course as of 07/29/24 0106   Sun Jul 28, 2024 2042 Patient slightly more responsive, disclosed to nurse now that he took "some pills "from some body" but will not say what. [DS]      ED Course User Index  [DS] Nikhil Cortez MD          20-year-old male presents brought in by mother through triage after about a day lying on the ground, getting thrown in the shower in order to try to wake him up, and still being minimally responsive.  Here " he is able to answer questions very slowly, move although he is slightly hyperreflexic, not truly rigid.  This is a very atypical and mixed toxidrome especially in the absence of anticholinergic or other sympathomimetic vital sign abnormalities, skin findings, etcetera.  No known risk factors for ciguatera,.  Carbon monoxide was considered, this is a send out test from here, he was put on oxygen empirically for this.  Will evaluate for acute pathology requiring intervention with appropriate studies, treat symptomatically, and reassess.    Studies reviewed.  It ultimately came to be that on his partners person they found a number of white pills that said 5300, which appears to be extended release tramadol, that toxidrome in that it is very slightly serotonergic, and I am very slightly at mixed adrenergic as well as opioid, fits well with this presentation.  Narcan was done and did not really seem to do anything.  His tox serologies are negative for alcohol acetaminophen and salicylates.  His CPK is extremely high, however I have based on presentation very much doubt that this is due to NMS, both in the absence of true rigidity, and absence of known medication ingestions that would be associated with this, in the fact that it is much more likely due to prolonged downtime.    Fluids ordered for rhabdo.    Case was discussed with poison control, discussed elements of care, they recommend for tramadol extended release a full 24 hours of monitoring for intermittent and delayed symptoms.    Case was discussed with his mother, who return to the ED.    Patient to be admitted.  Of note, I questioned the patient independently of mother of the time and he was awake enough to be able to answer questions.  He is adamant that this is not a self-harm attempt, that he just wanted to get high.  He has no history of psychiatric illness other than ADHD which is not pertinent, or self-harm attempts, and is cooperative to the apparent  best degree of his ability and understanding, including with the treatment process for rhabdo after explaining it..  It is my best judgment that he does not meet PEC criteria at this time.             35 minutes were spent in the critical care of this patient. Patient presented with acute encephalopathy in the setting of acute overdose, as well as rhabdomyolysis which is an acute life and limb threatening time-sensitive medical condition with high risk of decompensation .  Time spent including time at the present bedside, time obtaining additional ancillary information, ordering and interpreting studies, reassessments, ordering interventions, discussion with patient and family,.  This does not include time spent on separately billed for procedures.  I personally performed the critical care time documented here and it does not include time spent by a APPs or residents.        Clinical Impression:  Final diagnoses:  [G93.40] Encephalopathy  [T50.901A] Accidental overdose, initial encounter (Primary)  [M62.82] Non-traumatic rhabdomyolysis          ED Disposition Condition    Observation Stable                iNkhil Cortez MD  07/29/24 0106       Nikhil Cortez MD  07/29/24 0119

## 2024-07-29 NOTE — ED NOTES
Pt's mother Rigo would like to be called if there are any updates or changes in pt status. Pt gave verbal consent to share information with his mother.    Rigo [phone]

## 2024-07-30 VITALS
BODY MASS INDEX: 24.83 KG/M2 | SYSTOLIC BLOOD PRESSURE: 137 MMHG | OXYGEN SATURATION: 98 % | HEART RATE: 51 BPM | TEMPERATURE: 98 F | WEIGHT: 163.81 LBS | RESPIRATION RATE: 18 BRPM | DIASTOLIC BLOOD PRESSURE: 86 MMHG | HEIGHT: 68 IN

## 2024-07-30 PROBLEM — M62.82 NON-TRAUMATIC RHABDOMYOLYSIS: Status: RESOLVED | Noted: 2024-07-29 | Resolved: 2024-07-30

## 2024-07-30 PROBLEM — G93.40 ACUTE ENCEPHALOPATHY: Status: RESOLVED | Noted: 2024-07-29 | Resolved: 2024-07-30

## 2024-07-30 PROBLEM — T50.901A ACCIDENTAL OVERDOSE: Status: RESOLVED | Noted: 2024-07-29 | Resolved: 2024-07-30

## 2024-07-30 PROBLEM — T50.901A ACCIDENTAL DRUG OVERDOSE: Status: RESOLVED | Noted: 2024-07-29 | Resolved: 2024-07-30

## 2024-07-30 LAB
ALBUMIN SERPL BCP-MCNC: 3.2 G/DL (ref 3.5–5.2)
ALP SERPL-CCNC: 42 U/L (ref 55–135)
ALT SERPL W/O P-5'-P-CCNC: 27 U/L (ref 10–44)
ANION GAP SERPL CALC-SCNC: 10 MMOL/L (ref 8–16)
AST SERPL-CCNC: 70 U/L (ref 10–40)
BASOPHILS # BLD AUTO: 0.05 K/UL (ref 0–0.2)
BASOPHILS NFR BLD: 0.7 % (ref 0–1.9)
BILIRUB SERPL-MCNC: 0.9 MG/DL (ref 0.1–1)
BUN SERPL-MCNC: 12 MG/DL (ref 6–20)
CALCIUM SERPL-MCNC: 8.6 MG/DL (ref 8.7–10.5)
CHLORIDE SERPL-SCNC: 107 MMOL/L (ref 95–110)
CK SERPL-CCNC: 2612 U/L (ref 20–200)
CO2 SERPL-SCNC: 26 MMOL/L (ref 23–29)
CREAT SERPL-MCNC: 1.1 MG/DL (ref 0.5–1.4)
DIFFERENTIAL METHOD BLD: ABNORMAL
EOSINOPHIL # BLD AUTO: 0.1 K/UL (ref 0–0.5)
EOSINOPHIL NFR BLD: 1.9 % (ref 0–8)
ERYTHROCYTE [DISTWIDTH] IN BLOOD BY AUTOMATED COUNT: 13.6 % (ref 11.5–14.5)
EST. GFR  (NO RACE VARIABLE): >60 ML/MIN/1.73 M^2
GLUCOSE SERPL-MCNC: 89 MG/DL (ref 70–110)
HCT VFR BLD AUTO: 35.7 % (ref 40–54)
HGB BLD-MCNC: 12.2 G/DL (ref 14–18)
IMM GRANULOCYTES # BLD AUTO: 0.01 K/UL (ref 0–0.04)
IMM GRANULOCYTES NFR BLD AUTO: 0.1 % (ref 0–0.5)
LYMPHOCYTES # BLD AUTO: 2.3 K/UL (ref 1–4.8)
LYMPHOCYTES NFR BLD: 32.8 % (ref 18–48)
MAGNESIUM SERPL-MCNC: 1.7 MG/DL (ref 1.6–2.6)
MCH RBC QN AUTO: 28.8 PG (ref 27–31)
MCHC RBC AUTO-ENTMCNC: 34.2 G/DL (ref 32–36)
MCV RBC AUTO: 84 FL (ref 82–98)
MONOCYTES # BLD AUTO: 0.5 K/UL (ref 0.3–1)
MONOCYTES NFR BLD: 7.8 % (ref 4–15)
NEUTROPHILS # BLD AUTO: 3.9 K/UL (ref 1.8–7.7)
NEUTROPHILS NFR BLD: 56.7 % (ref 38–73)
NRBC BLD-RTO: 0 /100 WBC
PHOSPHATE SERPL-MCNC: 2.8 MG/DL (ref 2.7–4.5)
PLATELET # BLD AUTO: 197 K/UL (ref 150–450)
PMV BLD AUTO: 11.5 FL (ref 9.2–12.9)
POTASSIUM SERPL-SCNC: 3.6 MMOL/L (ref 3.5–5.1)
PROT SERPL-MCNC: 6.2 G/DL (ref 6–8.4)
RBC # BLD AUTO: 4.24 M/UL (ref 4.6–6.2)
SODIUM SERPL-SCNC: 143 MMOL/L (ref 136–145)
WBC # BLD AUTO: 6.93 K/UL (ref 3.9–12.7)

## 2024-07-30 PROCEDURE — 85025 COMPLETE CBC W/AUTO DIFF WBC: CPT

## 2024-07-30 PROCEDURE — 99223 1ST HOSP IP/OBS HIGH 75: CPT | Mod: ,,, | Performed by: PSYCHIATRY & NEUROLOGY

## 2024-07-30 PROCEDURE — 90833 PSYTX W PT W E/M 30 MIN: CPT | Mod: ,,, | Performed by: PSYCHIATRY & NEUROLOGY

## 2024-07-30 PROCEDURE — 36415 COLL VENOUS BLD VENIPUNCTURE: CPT | Performed by: STUDENT IN AN ORGANIZED HEALTH CARE EDUCATION/TRAINING PROGRAM

## 2024-07-30 PROCEDURE — 36415 COLL VENOUS BLD VENIPUNCTURE: CPT

## 2024-07-30 PROCEDURE — 83735 ASSAY OF MAGNESIUM: CPT

## 2024-07-30 PROCEDURE — 94761 N-INVAS EAR/PLS OXIMETRY MLT: CPT

## 2024-07-30 PROCEDURE — 80053 COMPREHEN METABOLIC PANEL: CPT

## 2024-07-30 PROCEDURE — 82550 ASSAY OF CK (CPK): CPT | Performed by: STUDENT IN AN ORGANIZED HEALTH CARE EDUCATION/TRAINING PROGRAM

## 2024-07-30 PROCEDURE — 84100 ASSAY OF PHOSPHORUS: CPT

## 2024-07-30 NOTE — PROGRESS NOTES
Discharge orders noted. Additional clinical references attached. Patient's discharge instructions given by bedside RN. Virtual nurse cued into room and reviewed discharge instructions. Education provided on new medication, diagnosis, and follow-up appointments. Teach back method used. Patient verbalized understanding. All questions answered.  Bedside nurse updated on patient status and to request transportation to [facility] when ready.   07/30/24 8691   AVS Confirmation   Discharge instructions and AVS provided to and reviewed with patient and/or significant other. Yes

## 2024-07-30 NOTE — PROGRESS NOTES
[facility] IM Resident HO-4 Progress Note    Subjective:      This morning Mr. Lyon is more alert than yesterday, said he feels normal. Wants to walk around. Asking to 'call his momma' hopeful to go home soon.      Objective:   Last 24 Hour Vital Signs:  BP  Min: 112/72  Max: 135/94  Temp  Av.3 °F (36.8 °C)  Min: 97.7 °F (36.5 °C)  Max: 98.6 °F (37 °C)  Pulse  Av  Min: 48  Max: 68  Resp  Av.7  Min: 16  Max: 18  SpO2  Av %  Min: 96 %  Max: 100 %  I/O last 3 completed shifts:  In: 4710 [P.O.:360; I.V.:3350; IV Piggyback:1000]  Out: 750 [Urine:750]    Physical Examination:  GEN- AOx3, Answering questions appropriately, participating in exam  HEENT- PERRL, EOMI, MMM. Scleral injection bilaterally.  NECK- No thyromegaly or other masses,  CARDIAC- RRR, no murmurs or rubs.   RESP- CTAB, normal work of breathing, no wheezes, crackles, or rhonchi  ABD- Soft, NT, ND  EXT- No clubbing, cyanosis, or edema; 2+ DP/PT pulses  NEURO - CN intact. Moves all four extremities spontaneously; light touch sensation intact and symmetric. 5/5 strength to bilateral upper and lower extremities.   SKIN -Warm and dry; no rashes    Laboratory:  Laboratory Data Reviewed: yes  Pertinent Findings:  Urine tox screen + for THC    CK: 3200.     Other Results:  Current Medications:     Infusions:         Scheduled:         PRN:    Current Facility-Administered Medications:     dextrose 10%, 12.5 g, Intravenous, PRN    dextrose 10%, 25 g, Intravenous, PRN    glucagon (human recombinant), 1 mg, Intramuscular, PRN    glucose, 16 g, Oral, PRN    glucose, 24 g, Oral, PRN    naloxone, 0.02 mg, Intravenous, PRN    ondansetron, 4 mg, Intravenous, Q8H PRN    sodium chloride 0.9%, 10 mL, Intravenous, Q12H PRN    Assessment:     Shaq Lyon is a 28 y.o.male with no prior PMH admitted for acute encephalopathy. Concern for tramadol overdose from story of bystander, no response to Narcan or respiratory depression. Endorses taking multiple edibles  of THC, Utox only postiive for THC. He has improved, alerta nd oriented this morning. Trending CK. Psych consult pending.      Plan:   Substance Overdose  Acute encephalopathy - Improved  Utox positive for THC, no respiratory depression, no response to Narcan, initially with respiratory acidosis since resolved. Didn't endorse other substance use yesterday but found with white pills.   - Continue to monitor  - Psych consult    Rhabdomyolysis  Elevated CK  Elevated Transaminase  CK >3200, 4100, 3100 improving. unclear how long was down. Denies pain, has full ROM of extremities. S/p 5L since admission of IVF  - 1.5mIVF   - repeat CK pending this am  - CMP QAM  - up out of bed walking today   -Monitor compartments for signs of compartment syndrome  -Strict I/O to monitor urine output for signs of worsening renal function    Diet: Regular  Ppx: SCDs    Dispo: Pending improvement in mental status, psych colby Hanley  U Internal Medicine/Pediatrics HO-4  U IM Service Team A    [facility] Medicine Hospitalist Pager numbers:   U Hospitalist Medicine Team A (Elias/Catarina): [phone]  U Hospitalist Medicine Team B (Arjun/Zina):  [phone]

## 2024-07-30 NOTE — PLAN OF CARE
WILLIAN met with pt and pt's mother Rigo [phone] at bedside to complete final d/c assessment. Pt will have his mother help transport him home later today. WILLIAN requested PCP f/u appt with Dr. Arreola per family request. WILLIAN provided [facility] information on AVS and informed the pt. Pt and family did not have any additional questions or concerns for sw. Rounds completed on pt. All questions addressed. Bedside nurse to discuss d/c medications. Discussed importance to attend all f/u appts and take medications as prescribed. Verbalized understanding. Case Management to sign off.     AZAEL Denton  [phone]     07/30/24 1503   Final Note   Assessment Type Final Discharge Note   Anticipated Discharge Disposition Home   What phone number can be called within the next 1-3 days to see how you are doing after discharge? [phone]   Post-Acute Status   Coverage Medicaid   Discharge Delays None known at this time

## 2024-07-30 NOTE — DISCHARGE SUMMARY
[facility] IM Discharge Summary    Primary Team: [facility] Team A  Attending Physician: Daphnie Elmore MD  Resident: CONSUELO Hanley  Intern: JOESPH Castillo    Date of Admit: 7/28/2024  Date of Discharge: 7/30/2024    Discharge to: Home  Condition: Improved    Discharge Diagnoses     Rhabdomyolysis - Resolved  Acute Encephalopathy - Resolved    Consultants and Procedures     Consultants:  Psychiatry    Procedures:   none    Brief History of Present Illness      Shaq Lyon is a 28 y.o. male who  has a past medical history of ADHD, Frontal lobe epilepsy, and Seizures.   The patient presented to [facility] on 7/28/2024 with a primary complaint of acute encephalopathy concern for drug overdose per mother.    For the full HPI please refer to the History & Physical from this admission.    Hospital Course By Problem with Pertinent Findings   Substance Overdose  Acute encephalopathy - Improved  Utox positive for THC, no respiratory depression, no response to Narcan, initially with respiratory acidosis since resolved. Didn't endorse other substance use yesterday but found with white pills.   - Returned to baseline mental status  - Not concerning for intentional overdose  - Psych consult, no concern for intent to self harm, has limited insight due to longstanding developmental disability. Provided resources for behavioral health and substance use treatment.      Rhabdomyolysis - Resolved  CK improved 4100 to 2000, with IVF hydration. No pain has full ROM of extremities.normal kidney function.     Discharge Medications        Medication List      You have not been prescribed any medications.         Discharge Information:   Diet:  Regular Diet    Physical Activity:  As tolerated    Instructions:  1. Take all medications as prescribed  2. Keep all follow-up appointments  3. Return to the hospital or call your primary care physicians if any worsening symptoms such as altered mental status, muscule pain, dark urine  occur.    Follow-Up Appointments:  Primary care in 2 weeks      Follow up items for PCP and tests that have not resulted at time of discharge:   Substance use  General Wellness      Talia Hanley  [facility] Internal Medicine, HO-4

## 2024-07-30 NOTE — PROGRESS NOTES
07/30/24 1516   Nurse Notification   Charge Nurse Notified? Yes   Name of Charge Nurse FARHAN Cervantes RN   Bedside Nurse Notified? Yes   Name of Bedside Nurse LUIS E cMrae RN   Nurse Notfication Method Secure Chat   Nurse Notified Of Other  (AVS prepared and okay to print for review.)    Notification    Notified? Yes   Name of  AZAEL Goldsmith    Notification Method Secure Chat    Notified Of Discharge Status

## 2024-07-30 NOTE — CONSULTS
PSYCHIATRY INPATIENT CONSULT NOTE      7/30/2024 9:20 AM   Shaq Lyon   1995   [id number]           DATE OF ADMISSION: 7/28/2024  7:47 PM    SITE: [facility]    CURRENT LEGAL STATUS: Patient does not currently meet PEC criteria due to not currently being an imminent threat to self/others and not being gravely disabled 2/2 mental illness at this time.     HISTORY    Per Initial History from Primary Team:  Drug Overdose (Pt found down on ground unresponsive about 2:30. Mother reports vomit on floor, tried putting ice on floor. Mother went back and check on the at 6:30 this morning and sleeping. Mother reports checked on him all day kept checking on him, and pt only responding a little throughout the day. She put pt in shower tonight and brought him to ER)  Found unresponsive prior to arrival at [facility] 7/28/24  Shaq Lyon is a 28 year old male with no PMH who presented to [facility] from [facility] ED after being brought in by his mother after being found down and unresponsive earlier that afternoon.  Per ED, patient was found next to multiple white pills with #5300 marked on them. They were consistent with Tramadol of unknown dose. Poison control was consulted by [facility] and they recommended 24h observation.   On arrival to [facility], patient is tired but will respond appropriately to questions and exam. He states he was at a party that day and had 1 alcoholic beverage. He denies using any drugs, but states he will occasionally smoke marijuana. He does not know why he is so tired. His only complaint is abdominal pain that started prior to the party and episode of vomiting at the party.   Tried to obtain collateral information from patient's mother, Rigo ([phone]) but went to [facility] x 2.  Interval History from Primary Team on 07/30/2024:  This morning Mr. Lyon is more alert than yesterday, said he feels normal. Wants to walk around. Asking to 'call his momma' hopeful to go  home soon.       Chief Complaint / Reason for Psychiatry Consult: Drug Overdose       HPI:   Shaq Lyon is a 28 y.o. male with a reported past medical history of Epilepsy and a past psychiatric history of ADHD and Cannabis Abuse, currently being treated by his inpatient primary treatment team for a principal problem of acute toxic / metabolic encephalopathy 2/2 drug overdose. Psychiatry was originally consulted as noted above.  The patient was seen and examined.  The chart was reviewed.  On examination today, the patient was alert and oriented to person, place, city, state, month, year, and situation.  He was CAM-ICU negative for delirium.  He did exhibit some child-like behavior consistent with his history of developmental disability (per his mother).  He endorses taking cannabis edibles from an unknown store in [place], [place], and states that he is confused as to why these ended up landing him in the hospital.  He does not think that they had any other drugs laced in them, but he is open to the possibility that Fentanyl or other drugs could have been laced in the edibles.  He states that he will never use drugs again due to fear that they could be laced with drugs and land him back in the hospital like this.  He states that his girlfriend took the same cannabis edibles and he is anxious to see her because he heard that she is also admitted to the hospital.  He is unaware if his girlfriend took any other substances.  He denies any current or recent symptoms of depression or anxiety / panic.  He endorses sleeping 7-8 hours per night and endorses a good appetite.  He endorses intermittent cannabis abuse but denies any other substance use / abuse.  He denies any current or prior active or passive SI / HI.  He denies any current or prior s/s consistent with psychosis, tony, PTSD, OCD, or eating disorders.  He denies AH, VH, TH, delusions, paranoia, or DIGFAST (tony) s/s.  He denies any adverse effects to their  current medication regimen.  He denies any medical/physical complaints at this time.  NAD observed on exam.  Patient was laughing at cartoons on the TV during the assessment.  Psychotherapy was implemented as noted below with a focus on improving cannabis / substance cessation & total sobriety.  See A/P below.       Collateral:   Per discussion with patient's mother, Rigo ([phone]):  Patient has a history of a developmental disability and currently appears back at or near baseline. He did not graduate high school. 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 He is reportedly well-behaved, he goes to [employer] and enjoys playing basketball and playing video games. He will occasionally have a cup of wine or smoke a joint. His mood is typically happy. No reported SI/HI. Patient has no access to firearms.  Patient reportedly became involved with a new girlfriend about 1 month ago. She has had difficulties with her own family and has been staying at Reggie Mom's house. She has reportedly brought drugs and alcohol to their house. Mother reports that she is confident that this situation is a product of his girlfriend's influence rather than Shaq attempting to end his life.  She feels safe with the patient's discharge once medically stable s/p accidental overdose.        Psychiatric Review Of Systems - Currently, the patient is endorsing and/or denying the following:  (patient's endorsements are BOLDED below; if not BOLDED, then patient denied):    Denies Symptoms of Depression: diminished mood, low motivation, loss of interest/anhedonia, irritability, diminished energy, change in sleep, change in appetite, diminished concentration or cognition or indecisiveness, PMA/R, excessive guilt or hopelessness or worthlessness, suicidal ideations    Denies issues with Sleep: initiation, maintenance, early morning awakening with inability to return to sleep    Denies Suicidal/Homicidal ideations: active/passive  ideations, organized plans, future intentions    Denies Symptoms of psychosis: hallucinations, delusions, disorganized thinking, disorganized behavior or abnormal motor behavior, or negative symptoms (diminshed emotional expression, avolition, anhedonia, alogia, asociality)     Denies Symptoms of tony or hypomania: elevated, expansive, or irritable mood with increased energy or activity; with inflated self-esteem or grandiosity, decreased need for sleep, increased rate of speech, FOI or racing thoughts, distractibility, increased goal directed activity or PMA, risky/disinhibited behavior    Denies Symptoms of AINSLEY: excessive anxiety/worry/fear, more days than not, about numerous issues, difficult to control, with restlessness, fatigue, poor concentration, irritability, muscle tension, sleep disturbance; causes functionally impairing distress     Denies Symptoms of Panic Disorder: recurrent panic attacks, precipitated or un-precipitated, source of worry and/or behavioral changes secondary; with or without agoraphobia    Denies Symptoms of PTSD: h/o trauma; re-experiencing/intrusive symptoms, avoidant behavior, negative alterations in cognition or mood, or hyperarousal symptoms; with or without dissociative symptoms     Denies Symptoms of OCD: obsessions or compulsions     Denies Symptoms of Eating Disorders: anorexia, bulimia or binging    Endorses Substance Use (cannabis): intoxication, withdrawal, tolerance, used in larger amounts or duration than intended, unsuccessful attempts to limit or quit, increased time engaging in or seeking out, cravings or strong desire to use, failure to fulfill obligations, negative consequences in social/interpersonal/occupational,/recreational areas, use in dangerous situations, medical or psychological consequences       [facility] Toolkit ASQ Suicide Screening Tool:  In the past few weeks, have you wished you were dead? Denies   In the past few weeks, have you felt that you or your  family would be better off if you were dead? Denies  In the past week, have you been having thoughts about killing yourself? Denies  Have you ever tried to kill yourself? Denies  Are you having thoughts of killing yourself right now? Denies       PSYCHOTHERAPY ADD-ON +[id number]   30 (16-37*) minutes    Time: 20 minutes  Participants: Met with patient    Therapeutic Intervention Type: behavior modifying psychotherapy, supportive psychotherapy  Why chosen therapy is appropriate versus another modality: relevant to diagnosis, patient responds to this modality, evidence based practice    Target symptoms: cannabis abuse (with concern for other unknown substances being laced with the cannabis product)   Primary focus: improving cannabis / substance cessation & total sobriety   Psychotherapeutic techniques: supportive and psychodynamic techniques; psycho-education; motivational interviewing; reality / insight orientation; CBT; problem solving techniques and managing life stressors    Outcome monitoring methods: self-report, observation    Patient's response to intervention:  The patient's response to intervention is accepting.    Progress toward goals:  The patient's progress toward goals is fair.      ROS:  General ROS: negative for - chills, fatigue, fever or night sweats  Ophthalmic ROS: negative for - blurry vision, double vision or eye pain  ENT ROS: negative for - sinus pain, headaches, sore throat or visual changes  Allergy and Immunology ROS: negative for - hives, itchy/watery eyes or nasal congestion  Hematological and Lymphatic ROS: negative for - bleeding problems, bruising, jaundice or pallor  Endocrine ROS: negative for - galactorrhea, hot flashes, mood swings, palpitations or temperature intolerance  Respiratory ROS: negative for - cough, hemoptysis, shortness of breath, tachypnea or wheezing  Cardiovascular ROS: negative for - chest pain, dyspnea on exertion, loss of consciousness, palpitations, rapid heart rate  or shortness of breath  Gastrointestinal ROS: negative for - appetite loss, nausea, abdominal pain, blood in stools, change in bowel habits, constipation or diarrhea  Genito-Urinary ROS: negative for - incontinence, nocturia or pelvic pain  Musculoskeletal ROS: negative for - joint stiffness, joint swelling, joint pain or muscle pain   Neurological ROS: negative for - behavioral changes, confusion, dizziness, memory loss, numbness/tingling or seizures  Dermatological ROS: negative for dry skin, hair changes, pruritus or rash  Psychiatric ROS: see detailed psychiatric ROS above in history section       Past Psychiatric History:  Previous Diagnoses: ADHD and Developmental Disability   Previous Medication Trials: unknown possible antidepressant in 2004    Previous Psychiatric Hospitalizations: denies    Previous Suicide Attempts: denies  History of Violence: denies  Outpatient Psychiatrist: denies      Social History:  Marital Status: single  Children: 0   Employment Status/Info: currently employed by his mother  Education: 9th grade  Special Ed: yes  : denies  Buddhist: Jewish   Housing Status: yes, lives with his mother in [place]   Hobbies/Leisure time: yes, playing basketball and video games   History of phys/sexual abuse: denies   Access to gun: denies      Family Psychiatric History: denies      Substance Abuse History:  Recreational Drugs: intermittent cannabis abuse ; denies other   Use of Alcohol: minimal and intermittent   Rehab History: denies  Tobacco Use: denies  Use of Caffeine: denies   Use of OTC: denies   Legal consequences of chemical use: denies      Legal History:  Past Charges/Incarcerations: denies   Pending charges: denies     Psychosocial Stressors: family and cannabis abuse  Functioning Relationships: good support system in his mother  Strengths AND Liabilities: Strength: Patient accepts guidance/feedback, Strength: Patient has positive support network., Liability:  Patient has intellectual deficits., Liability: Patient lacks coping skills.      PAST MEDICAL & SURGICAL HISTORY   Past Medical History:   Diagnosis Date    ADHD     Frontal lobe epilepsy     Seizures      No past surgical history on file.    NEUROLOGIC HISTORY  Seizures: yes   Head trauma: yes  CVA: denies      FAMILY HISTORY   No family history on file.    ALLERGIES   Review of patient's allergies indicates:  No Known Allergies    CURRENT MEDICATION REGIMEN   Home Meds:   Prior to Admission medications    Not on File       OTC Meds: denies     Scheduled Meds:    PRN Meds:   Current Facility-Administered Medications:     dextrose 10%, 12.5 g, Intravenous, PRN    dextrose 10%, 25 g, Intravenous, PRN    glucagon (human recombinant), 1 mg, Intramuscular, PRN    glucose, 16 g, Oral, PRN    glucose, 24 g, Oral, PRN    naloxone, 0.02 mg, Intravenous, PRN    ondansetron, 4 mg, Intravenous, Q8H PRN    sodium chloride 0.9%, 10 mL, Intravenous, Q12H PRN   Psychotherapeutics (From admission, onward)      None            LABORATORY DATA   Recent Results (from the past 72 hour(s))   EKG 12-lead    Collection Time: 07/28/24  7:53 PM   Result Value Ref Range    QRS Duration 80 ms    [facility] QTC Calculation 406 ms   CBC auto differential    Collection Time: 07/28/24  7:58 PM   Result Value Ref Range    WBC 13.30 (H) 3.90 - 12.70 K/uL    RBC 5.13 4.60 - 6.20 M/uL    Hemoglobin 14.7 14.0 - 18.0 g/dL    Hematocrit 43.0 40.0 - 54.0 %    MCV 84 82 - 98 fL    MCH 28.7 27.0 - 31.0 pg    MCHC 34.2 32.0 - 36.0 g/dL    RDW 13.1 11.5 - 14.5 %    Platelets 235 150 - 450 K/uL    MPV 10.9 9.2 - 12.9 fL    Immature Granulocytes 0.3 0.0 - 0.5 %    Gran # (ANC) 10.6 (H) 1.8 - 7.7 K/uL    Immature Grans (Abs) 0.04 0.00 - 0.04 K/uL    Lymph # 1.7 1.0 - 4.8 K/uL    Mono # 1.0 0.3 - 1.0 K/uL    Eos # 0.0 0.0 - 0.5 K/uL    Baso # 0.01 0.00 - 0.20 K/uL    nRBC 0 0 /100 WBC    Gran % 79.6 (H) 38.0 - 73.0 %    Lymph % 12.6 (L) 18.0 - 48.0 %    Mono % 7.4 4.0  - 15.0 %    Eosinophil % 0.0 0.0 - 8.0 %    Basophil % 0.1 0.0 - 1.9 %    Differential Method Automated    Comprehensive metabolic panel    Collection Time: 07/28/24  7:58 PM   Result Value Ref Range    Sodium 139 136 - 145 mmol/L    Potassium 4.0 3.5 - 5.1 mmol/L    Chloride 100 95 - 110 mmol/L    CO2 27 23 - 29 mmol/L    Glucose 130 (H) 70 - 110 mg/dL    BUN 13 2 - 20 mg/dL    Creatinine 0.83 0.50 - 1.40 mg/dL    Calcium 9.1 8.7 - 10.5 mg/dL    Total Protein 8.4 6.0 - 8.4 g/dL    Albumin 4.5 3.5 - 5.2 g/dL    Total Bilirubin 1.4 (H) 0.1 - 1.0 mg/dL    Alkaline Phosphatase 55 38 - 126 U/L     (H) 15 - 46 U/L    ALT 26 10 - 44 U/L    Anion Gap 12 8 - 16 mmol/L    eGFR >60.0 >60 mL/min/1.73 m^2   Ethanol    Collection Time: 07/28/24  7:58 PM   Result Value Ref Range    Alcohol, Serum <10 <10 mg/dL   Acetaminophen level    Collection Time: 07/28/24  7:58 PM   Result Value Ref Range    Acetaminophen (Tylenol), Serum <10.0 10.0 - 20.0 ug/mL   Salicylate level    Collection Time: 07/28/24  7:58 PM   Result Value Ref Range    Salicylate Lvl <5.0 (L) 15.0 - 30.0 mg/dL   CPK    Collection Time: 07/28/24  7:58 PM   Result Value Ref Range    CPK >3200 (H) 55 - 170 U/L   POCT glucose    Collection Time: 07/28/24  8:06 PM   Result Value Ref Range    POCT Glucose 118 (H) 70 - 110 mg/dL   ISTAT PROCEDURE    Collection Time: 07/28/24  8:09 PM   Result Value Ref Range    POC PH 7.296 (L) 7.35 - 7.45    POC PCO2 57.1 (HH) 35 - 45 mmHg    POC PO2 215 (H) 80 - 100 mmHg    POC HCO3 27.8 24 - 28 mmol/L    POC BE 1 -2 to 2 mmol/L    POC SATURATED O2 100 95 - 100 %    POC TCO2 30 (H) 23 - 27 mmol/L    Sample ARTERIAL     Site RR     Allens Test Pass     DelSys Nasal Can    Drug screen panel, emergency    Collection Time: 07/29/24  3:52 AM   Result Value Ref Range    Benzodiazepines Negative Negative    Methadone metabolites Negative Negative    Cocaine (Metab.) Negative Negative    Opiate Scrn, Ur Negative Negative    Barbiturate  Screen, Ur Negative Negative    Amphetamine Screen, Ur Negative Negative    THC Presumptive Positive (A) Negative    Phencyclidine Negative Negative    Creatinine, Urine 47.2 23.0 - 375.0 mg/dL    Toxicology Information SEE COMMENT    Urinalysis, Reflex to Urine Culture Urine, Clean Catch    Collection Time: 07/29/24  3:55 AM    Specimen: Urine   Result Value Ref Range    Specimen UA Urine, Clean Catch     Color, UA Colorless (A) Yellow, Straw, Елена    Appearance, UA Clear Clear    pH, UA 6.0 5.0 - 8.0    Specific Gravity, UA 1.010 1.005 - 1.030    Protein, UA Negative Negative    Glucose, UA Negative Negative    Ketones, UA Negative Negative    Bilirubin (UA) Negative Negative    Occult Blood UA Negative Negative    Nitrite, UA Negative Negative    Urobilinogen, UA Negative <2.0 EU/dL    Leukocytes, UA Negative Negative   CK    Collection Time: 07/29/24  5:25 AM   Result Value Ref Range    CPK 4107 (H) 20 - 200 U/L   CBC with Automated Differential    Collection Time: 07/29/24  5:25 AM   Result Value Ref Range    WBC 8.33 3.90 - 12.70 K/uL    RBC 4.68 4.60 - 6.20 M/uL    Hemoglobin 13.2 (L) 14.0 - 18.0 g/dL    Hematocrit 39.5 (L) 40.0 - 54.0 %    MCV 84 82 - 98 fL    MCH 28.2 27.0 - 31.0 pg    MCHC 33.4 32.0 - 36.0 g/dL    RDW 13.5 11.5 - 14.5 %    Platelets 193 150 - 450 K/uL    MPV 11.2 9.2 - 12.9 fL    Immature Granulocytes 0.2 0.0 - 0.5 %    Gran # (ANC) 6.0 1.8 - 7.7 K/uL    Immature Grans (Abs) 0.02 0.00 - 0.04 K/uL    Lymph # 1.7 1.0 - 4.8 K/uL    Mono # 0.6 0.3 - 1.0 K/uL    Eos # 0.0 0.0 - 0.5 K/uL    Baso # 0.03 0.00 - 0.20 K/uL    nRBC 0 0 /100 WBC    Gran % 71.5 38.0 - 73.0 %    Lymph % 19.9 18.0 - 48.0 %    Mono % 7.6 4.0 - 15.0 %    Eosinophil % 0.4 0.0 - 8.0 %    Basophil % 0.4 0.0 - 1.9 %    Differential Method Automated    PT/INR    Collection Time: 07/29/24  5:25 AM   Result Value Ref Range    Prothrombin Time 12.6 (H) 9.0 - 12.5 sec    INR 1.2 0.8 - 1.2   Lactic acid, plasma    Collection Time:  07/29/24  5:25 AM   Result Value Ref Range    Lactate (Lactic Acid) 1.1 0.5 - 2.2 mmol/L   Comprehensive Metabolic Panel (CMP)    Collection Time: 07/29/24  5:26 AM   Result Value Ref Range    Sodium 141 136 - 145 mmol/L    Potassium 4.2 3.5 - 5.1 mmol/L    Chloride 109 95 - 110 mmol/L    CO2 27 23 - 29 mmol/L    Glucose 100 70 - 110 mg/dL    BUN 11 6 - 20 mg/dL    Creatinine 0.9 0.5 - 1.4 mg/dL    Calcium 8.8 8.7 - 10.5 mg/dL    Total Protein 6.5 6.0 - 8.4 g/dL    Albumin 3.4 (L) 3.5 - 5.2 g/dL    Total Bilirubin 1.4 (H) 0.1 - 1.0 mg/dL    Alkaline Phosphatase 41 (L) 55 - 135 U/L    AST 85 (H) 10 - 40 U/L    ALT 23 10 - 44 U/L    eGFR >60 >60 mL/min/1.73 m^2    Anion Gap 5 (L) 8 - 16 mmol/L   Magnesium    Collection Time: 07/29/24  5:26 AM   Result Value Ref Range    Magnesium 1.9 1.6 - 2.6 mg/dL   Phosphorus    Collection Time: 07/29/24  5:26 AM   Result Value Ref Range    Phosphorus 2.6 (L) 2.7 - 4.5 mg/dL   POCT Venous Blood Gas    Collection Time: 07/29/24  5:55 AM   Result Value Ref Range    POC PH 7.322 (L) 7.350 - 7.450    POC PCO2 56.3 (H) 35.0 - 45.0 mmHg    POC PO2 24.6 (LL) 40.0 - 60.0 mmHg    POC SATURATED O2 45.5 (LL) 95.0 - 100.0 %    POC HCO3 29.2 (H) 24.0 - 28.0 mmol/l    Base Deficit 1.9 -2.0 - 2.0 mmol/l    Specimen source Venous     Performed By: ana paula     Allens Test YES     FiO2 21.0 %   Basic metabolic panel    Collection Time: 07/29/24  2:47 PM   Result Value Ref Range    Sodium 143 136 - 145 mmol/L    Potassium 3.9 3.5 - 5.1 mmol/L    Chloride 108 95 - 110 mmol/L    CO2 28 23 - 29 mmol/L    Glucose 105 70 - 110 mg/dL    BUN 12 6 - 20 mg/dL    Creatinine 1.1 0.5 - 1.4 mg/dL    Calcium 8.6 (L) 8.7 - 10.5 mg/dL    Anion Gap 7 (L) 8 - 16 mmol/L    eGFR >60 >60 mL/min/1.73 m^2   CK    Collection Time: 07/29/24  2:47 PM   Result Value Ref Range    CPK 3246 (H) 20 - 200 U/L   POCT glucose    Collection Time: 07/29/24  8:37 PM   Result Value Ref Range    POCT Glucose 98 70 - 110 mg/dL  "  Comprehensive Metabolic Panel (CMP)    Collection Time: 07/30/24  5:14 AM   Result Value Ref Range    Sodium 143 136 - 145 mmol/L    Potassium 3.6 3.5 - 5.1 mmol/L    Chloride 107 95 - 110 mmol/L    CO2 26 23 - 29 mmol/L    Glucose 89 70 - 110 mg/dL    BUN 12 6 - 20 mg/dL    Creatinine 1.1 0.5 - 1.4 mg/dL    Calcium 8.6 (L) 8.7 - 10.5 mg/dL    Total Protein 6.2 6.0 - 8.4 g/dL    Albumin 3.2 (L) 3.5 - 5.2 g/dL    Total Bilirubin 0.9 0.1 - 1.0 mg/dL    Alkaline Phosphatase 42 (L) 55 - 135 U/L    AST 70 (H) 10 - 40 U/L    ALT 27 10 - 44 U/L    eGFR >60 >60 mL/min/1.73 m^2    Anion Gap 10 8 - 16 mmol/L   Magnesium    Collection Time: 07/30/24  5:14 AM   Result Value Ref Range    Magnesium 1.7 1.6 - 2.6 mg/dL   Phosphorus    Collection Time: 07/30/24  5:14 AM   Result Value Ref Range    Phosphorus 2.8 2.7 - 4.5 mg/dL   CBC with Automated Differential    Collection Time: 07/30/24  5:14 AM   Result Value Ref Range    WBC 6.93 3.90 - 12.70 K/uL    RBC 4.24 (L) 4.60 - 6.20 M/uL    Hemoglobin 12.2 (L) 14.0 - 18.0 g/dL    Hematocrit 35.7 (L) 40.0 - 54.0 %    MCV 84 82 - 98 fL    MCH 28.8 27.0 - 31.0 pg    MCHC 34.2 32.0 - 36.0 g/dL    RDW 13.6 11.5 - 14.5 %    Platelets 197 150 - 450 K/uL    MPV 11.5 9.2 - 12.9 fL    Immature Granulocytes 0.1 0.0 - 0.5 %    Gran # (ANC) 3.9 1.8 - 7.7 K/uL    Immature Grans (Abs) 0.01 0.00 - 0.04 K/uL    Lymph # 2.3 1.0 - 4.8 K/uL    Mono # 0.5 0.3 - 1.0 K/uL    Eos # 0.1 0.0 - 0.5 K/uL    Baso # 0.05 0.00 - 0.20 K/uL    nRBC 0 0 /100 WBC    Gran % 56.7 38.0 - 73.0 %    Lymph % 32.8 18.0 - 48.0 %    Mono % 7.8 4.0 - 15.0 %    Eosinophil % 1.9 0.0 - 8.0 %    Basophil % 0.7 0.0 - 1.9 %    Differential Method Automated    CK    Collection Time: 07/30/24  8:10 AM   Result Value Ref Range    CPK 2612 (H) 20 - 200 U/L      No results found for: "PHENYTOIN", "PHENOBARB", "VALPROATE", "CBMZ"      EXAMINATION    VITALS   Vitals:    07/30/24 0812 07/30/24 0814 07/30/24 1138 07/30/24 1217   BP: (!) " "135/94  (!) 151/91    BP Location:       Patient Position:       Pulse: (!) 53  (!) 48 (!) 47   Resp: 18  18    Temp: 98.1 °F (36.7 °C)  97.7 °F (36.5 °C)    TempSrc:       SpO2: 98% 98% 98%    Weight:       Height:            CONSTITUTIONAL  General Appearance: NAD, unremarkable, age appropriate, normal weight, lying in bed, calm    MUSCULOSKELETAL  Muscle Strength and Tone: WNL    Abnormal Involuntary Movements: none observed   Gait and Station: WNL; non-ataxic     PSYCHIATRIC   Behavior/Cooperation:  friendly and cooperative, calm, eye contact intermittent, child-like   Speech:  normal tone, normal rate, normal pitch, normal volume  Language: grossly intact, able to name, able to repeat with spontaneous speech  Mood: "OK ; worried about my girlfriend"  Affect:  mildly constricted   Associations: intact; no DELFIN  Thought Process: Linear and Future-Oriented   Thought Content: denies SI, HI, AH, VH, TH, delusions, or paranoia (no RIS observed)   Sensorium: Awake  Alert and Oriented: to person, place, city, state, month, year, and situation   Memory: 3/3 immediate, 1/3 at 5 minutes    Recent: Intact; able to report intermittent recent events   Remote: Limited / Intact; Named 2/4 past presidents   Attention/concentration: Intact. Appropriate for age/education/developmental disability hx. Able to spell [id number] but NOT [id number].   Similarities: Intact (difference between apple and orange?)  Abstract reasoning: Limited Appropriate for age/education/developmental disability hx.  Fund of Knowledge: Named 2/4 past presidents  Insight: Intact  Judgment: Intact    CAM ICU Delirium Assessment - NEGATIVE    Is the patient aware of the biomedical complications associated with substance abuse and mental illness? yes        MEDICAL DECISION MAKING    ASSESSMENT        Accidental / Unintentional Drug Overdose, Initial Encounter (suspected Cannabis unknowingly laced with Fentanyl)   Acute Toxic / Metabolic Encephalopathy (now " "resolved with time elapsed since drug intoxication / overdose)   Cannabis Abuse   Developmental Disability        RECOMMENDATIONS       - With reasonable medical certainty, based on a present-state examination in conjunction with collateral from the patient's mother, the patient does not currently meet PEC criteria due to not being an imminent threat to self/others and not being gravely disabled 2/2 mental illness at this time.     - No indication for beginning any psychiatric pharmacotherapy at this time (discussed risks/benefits/alt vs no treatment with patient and his mother with patient's verbal consent).     - Psychotherapy was performed with patient as noted above with a focus on improving cannabis / substance cessation & total sobriety.    - Patient's most recent resulted labs, imaging, and EKG were reviewed today ; UDS positive for THC ; Ethanol < 10 ; CT Head with "No acute abnormality."     - Please have Hospital CM/SW assist patient with outpatient mental health / addiction f/u for s/p discharge from this facility (possibly at [facility] in [place], [place]) ; patient denies any interest in residential rehab or IOP programs at this time despite counseling, education, and motivational interviewing.     - Patient and his mother (with patient's verbal consent) were instructed to call 911 and/or [phone] and return to the nearest ED if he begins feeling suicidal, homicidal, or gravely disabled (for s/p discharge from this facility).     - Thank you for this consult ; will continue to follow patient while at [facility]         Total time spent with patient and/or managing/coordinating patient's care today (excluding the time spent on psychotherapy): 78 minutes   Time spent on psychotherapy today (as noted above): 20 minutes   Total time for encounter today including psychotherapy: 98 minutes      More than 50% of the time was spent counseling/coordinating care.     Consulting clinician was informed of " the encounter and consult note.       STAFF:  Douglas Dennison MD  [facility]   7/30/2024

## 2024-07-30 NOTE — DISCHARGE INSTRUCTIONS
- Establish care with a primary care doctor near home, followup in 1 week to check in on everything  - Mental Health resources to help support against using substances, and general mood support include the [facility]  [address] [place] [address]  Phone: [phone]

## 2024-07-31 LAB — ETHYLENE GLYCOL SERPLBLD-MCNC: NOT DETECTED MG/DL

## 2024-08-02 LAB — MAYO MISCELLANEOUS RESULT (REF): NORMAL
